# Patient Record
Sex: FEMALE | Race: BLACK OR AFRICAN AMERICAN | NOT HISPANIC OR LATINO | Employment: OTHER | URBAN - METROPOLITAN AREA
[De-identification: names, ages, dates, MRNs, and addresses within clinical notes are randomized per-mention and may not be internally consistent; named-entity substitution may affect disease eponyms.]

---

## 2017-01-30 RX ORDER — ESCITALOPRAM OXALATE 20 MG/1
TABLET ORAL
Qty: 90 TABLET | Refills: 0 | Status: SHIPPED | OUTPATIENT
Start: 2017-01-30 | End: 2017-04-26 | Stop reason: SDUPTHER

## 2017-02-06 ENCOUNTER — TELEPHONE (OUTPATIENT)
Dept: FAMILY MEDICINE | Facility: CLINIC | Age: 77
End: 2017-02-06

## 2017-02-06 ENCOUNTER — PATIENT MESSAGE (OUTPATIENT)
Dept: FAMILY MEDICINE | Facility: CLINIC | Age: 77
End: 2017-02-06

## 2017-02-06 ENCOUNTER — LAB VISIT (OUTPATIENT)
Dept: LAB | Facility: HOSPITAL | Age: 77
End: 2017-02-06
Attending: FAMILY MEDICINE
Payer: MEDICARE

## 2017-02-06 DIAGNOSIS — E78.5 DYSLIPIDEMIA: ICD-10-CM

## 2017-02-06 DIAGNOSIS — I10 ESSENTIAL HYPERTENSION: ICD-10-CM

## 2017-02-06 DIAGNOSIS — F03.91 DEMENTIA WITH BEHAVIORAL DISTURBANCE, UNSPECIFIED DEMENTIA TYPE: Primary | ICD-10-CM

## 2017-02-06 DIAGNOSIS — R79.9 ABNORMAL FINDING OF BLOOD CHEMISTRY: ICD-10-CM

## 2017-02-06 LAB
ALBUMIN SERPL BCP-MCNC: 3.7 G/DL
ALP SERPL-CCNC: 90 U/L
ALT SERPL W/O P-5'-P-CCNC: 17 U/L
ANION GAP SERPL CALC-SCNC: 11 MMOL/L
AST SERPL-CCNC: 20 U/L
BASOPHILS # BLD AUTO: 0.02 K/UL
BASOPHILS NFR BLD: 0.3 %
BILIRUB SERPL-MCNC: 0.8 MG/DL
BUN SERPL-MCNC: 19 MG/DL
CALCIUM SERPL-MCNC: 9.5 MG/DL
CHLORIDE SERPL-SCNC: 105 MMOL/L
CO2 SERPL-SCNC: 25 MMOL/L
CREAT SERPL-MCNC: 1.3 MG/DL
DIFFERENTIAL METHOD: ABNORMAL
EOSINOPHIL # BLD AUTO: 0.2 K/UL
EOSINOPHIL NFR BLD: 2.6 %
ERYTHROCYTE [DISTWIDTH] IN BLOOD BY AUTOMATED COUNT: 13.2 %
EST. GFR  (AFRICAN AMERICAN): 45 ML/MIN/1.73 M^2
EST. GFR  (NON AFRICAN AMERICAN): 39 ML/MIN/1.73 M^2
GLUCOSE SERPL-MCNC: 143 MG/DL
HCT VFR BLD AUTO: 37.2 %
HGB BLD-MCNC: 13 G/DL
LYMPHOCYTES # BLD AUTO: 2.3 K/UL
LYMPHOCYTES NFR BLD: 35.6 %
MCH RBC QN AUTO: 31.4 PG
MCHC RBC AUTO-ENTMCNC: 34.9 %
MCV RBC AUTO: 90 FL
MONOCYTES # BLD AUTO: 0.4 K/UL
MONOCYTES NFR BLD: 6.7 %
NEUTROPHILS # BLD AUTO: 3.5 K/UL
NEUTROPHILS NFR BLD: 54.8 %
PLATELET # BLD AUTO: 144 K/UL
PMV BLD AUTO: 10.7 FL
POTASSIUM SERPL-SCNC: 4.2 MMOL/L
PROT SERPL-MCNC: 7.4 G/DL
RBC # BLD AUTO: 4.14 M/UL
SODIUM SERPL-SCNC: 141 MMOL/L
WBC # BLD AUTO: 6.43 K/UL

## 2017-02-06 PROCEDURE — 80053 COMPREHEN METABOLIC PANEL: CPT

## 2017-02-06 PROCEDURE — 83036 HEMOGLOBIN GLYCOSYLATED A1C: CPT

## 2017-02-06 PROCEDURE — 85025 COMPLETE CBC W/AUTO DIFF WBC: CPT

## 2017-02-06 PROCEDURE — 80061 LIPID PANEL: CPT

## 2017-02-06 PROCEDURE — 36415 COLL VENOUS BLD VENIPUNCTURE: CPT

## 2017-02-07 LAB
CHOLEST/HDLC SERPL: 4.7 {RATIO}
ESTIMATED AVG GLUCOSE: 157 MG/DL
HBA1C MFR BLD HPLC: 7.1 %
HDL/CHOLESTEROL RATIO: 21.3 %
HDLC SERPL-MCNC: 169 MG/DL
HDLC SERPL-MCNC: 36 MG/DL
LDLC SERPL CALC-MCNC: 111.2 MG/DL
NONHDLC SERPL-MCNC: 133 MG/DL
TRIGL SERPL-MCNC: 109 MG/DL

## 2017-02-10 ENCOUNTER — PATIENT MESSAGE (OUTPATIENT)
Dept: FAMILY MEDICINE | Facility: CLINIC | Age: 77
End: 2017-02-10

## 2017-02-13 ENCOUNTER — TELEPHONE (OUTPATIENT)
Dept: FAMILY MEDICINE | Facility: CLINIC | Age: 77
End: 2017-02-13

## 2017-02-13 ENCOUNTER — TELEPHONE (OUTPATIENT)
Dept: INTERNAL MEDICINE | Facility: CLINIC | Age: 77
End: 2017-02-13

## 2017-02-13 NOTE — TELEPHONE ENCOUNTER
----- Message from Judith Aguiar sent at 2/10/2017  3:32 PM CST -----  Contact: Leigh/[employer]  Leigh stated that they  location is in [place], [place] and that can not provide equipments  for the [place] location. And the request for the bedside commode will be cancel, Please call her at [phone] if there is any question.      Thanks  Td

## 2017-02-13 NOTE — TELEPHONE ENCOUNTER
Returned call. Spoke with patients daughter and she was just calling to check on order for potty chair for patient. Informed patients daughter that info was faxed over Friday to [facility]. Pts daughter verbalized understanding.

## 2017-02-13 NOTE — TELEPHONE ENCOUNTER
Returned call. Spoke with . The papers were faxed to the wrong supply agency. Orders pulled from fax stack and refaxed to [facility].

## 2017-02-13 NOTE — TELEPHONE ENCOUNTER
----- Message from Ashley Parker sent at 2/10/2017  1:44 PM CST -----  Contact: pt  Pt daughter Hermelindo is requesting a call back, please call her at [phone]

## 2017-02-14 RX ORDER — PRAVASTATIN SODIUM 40 MG/1
TABLET ORAL
Qty: 90 TABLET | Refills: 0 | Status: SHIPPED | OUTPATIENT
Start: 2017-02-14 | End: 2017-05-10

## 2017-03-07 RX ORDER — DONEPEZIL HYDROCHLORIDE 10 MG/1
TABLET, FILM COATED ORAL
Qty: 90 TABLET | Refills: 0 | Status: SHIPPED | OUTPATIENT
Start: 2017-03-07 | End: 2017-05-10

## 2017-03-27 RX ORDER — QUETIAPINE FUMARATE 100 MG/1
TABLET, FILM COATED ORAL
Qty: 450 TABLET | Refills: 0 | Status: SHIPPED | OUTPATIENT
Start: 2017-03-27

## 2017-04-17 ENCOUNTER — OFFICE VISIT (OUTPATIENT)
Dept: FAMILY MEDICINE | Facility: CLINIC | Age: 77
End: 2017-04-17
Payer: MEDICARE

## 2017-04-17 VITALS
RESPIRATION RATE: 18 BRPM | TEMPERATURE: 98 F | DIASTOLIC BLOOD PRESSURE: 72 MMHG | WEIGHT: 175.94 LBS | HEART RATE: 76 BPM | HEIGHT: 62 IN | BODY MASS INDEX: 32.37 KG/M2 | SYSTOLIC BLOOD PRESSURE: 127 MMHG

## 2017-04-17 DIAGNOSIS — Z71.85 IMMUNIZATION COUNSELING: ICD-10-CM

## 2017-04-17 DIAGNOSIS — I10 ESSENTIAL HYPERTENSION: ICD-10-CM

## 2017-04-17 DIAGNOSIS — G30.1 LATE ONSET ALZHEIMER'S DISEASE WITHOUT BEHAVIORAL DISTURBANCE: Primary | ICD-10-CM

## 2017-04-17 DIAGNOSIS — E78.2 MIXED HYPERLIPIDEMIA: ICD-10-CM

## 2017-04-17 DIAGNOSIS — F02.80 LATE ONSET ALZHEIMER'S DISEASE WITHOUT BEHAVIORAL DISTURBANCE: Primary | ICD-10-CM

## 2017-04-17 PROCEDURE — 99999 PR PBB SHADOW E&M-EST. PATIENT-LVL III: CPT | Mod: PBBFAC,,, | Performed by: NURSE PRACTITIONER

## 2017-04-17 PROCEDURE — 99213 OFFICE O/P EST LOW 20 MIN: CPT | Mod: S$PBB,,, | Performed by: NURSE PRACTITIONER

## 2017-04-17 PROCEDURE — 99213 OFFICE O/P EST LOW 20 MIN: CPT | Mod: PBBFAC,PO | Performed by: NURSE PRACTITIONER

## 2017-04-17 NOTE — MR AVS SNAPSHOT
[facility] Medicine  [address]  [facility] [address]  Phone: [phone]  Fax: [phone]                  Lisha Aiken   2017 4:40 PM   Office Visit    Description:  Female : 1940   Provider:  Courtney Alcazar NP   Department:  [facility]           Reason for Visit     Follow-up           Diagnoses this Visit        Comments    Late onset Alzheimer's disease without behavioral disturbance    -  Primary     Essential hypertension         Mixed hyperlipidemia         Immunization counseling                To Do List           Future Appointments        Provider Department Dept Phone    2017 4:20 PM Randi Correa MD [facility] [phone]    2017 4:00 PM Suhail Trejo MD [facility] - Neurology [phone]      Goals (5 Years of Data)     None      [facility]     [facility] Nurse Care Line -  Assistance  Unless otherwise directed by your provider, please contact [facility], our nurse care line that is available for  assistance.     Registered nurses in the [facility] Center provide: appointment scheduling, clinical advisement, health education, and other advisory services.  Call: [phone] (toll free)               Medications           Message regarding Medications     Verify the changes and/or additions to your medication regime listed below are the same as discussed with your clinician today.  If any of these changes or additions are incorrect, please notify your healthcare provider.             Verify that the below list of medications is an accurate representation of the medications you are currently taking.  If none reported, the list may be blank. If incorrect, please contact your healthcare provider. Carry this list with you in case of emergency.           Current Medications     aspirin (ECOTRIN) 81 MG EC tablet Take 81 mg by mouth once daily.    CALCIUM  "CARBONATE/VITAMIN D3 (CALTRATE-600 + D VIT D3, 800, ORAL) Take by mouth.    donepezil (ARICEPT) 10 MG tablet TAKE 1 TABLET BY MOUTH EVERY DAY    donepezil (ARICEPT) 10 MG tablet TAKE 1 TABLET BY MOUTH ONCE DAILY    escitalopram oxalate (LEXAPRO) 20 MG tablet TAKE 1 TABLET BY MOUTH EVERY DAY    lisinopril-hydrochlorothiazide (PRINZIDE,ZESTORETIC) 20-12.5 mg per tablet Take 1 tablet by mouth once daily.    pravastatin (PRAVACHOL) 10 MG tablet TAKE ONE TABLET BY MOUTH ONCE NIGHTLY    pravastatin (PRAVACHOL) 40 MG tablet TAKE 1 TABLET BY MOUTH DAILY    quetiapine (SEROQUEL) 100 MG Tab TAKE 1 TABLET BY MOUTH EVERY MORNING AND TAKE 2 TABLETS BY MOUTH TWICE DAILY           Clinical Reference Information           Your Vitals Were     BP Pulse Temp Resp Height Weight    127/72 76 98.1 °F (36.7 °C) (Oral) 18 5' 2" (1.575 m) 79.8 kg (175 lb 14.8 oz)    BMI                32.18 kg/m2          Blood Pressure          Most Recent Value    BP  127/72      Allergies as of 4/17/2017     No Known Allergies      Immunizations Administered on Date of Encounter - 4/17/2017     None      Language Assistance Services     ATTENTION: Language assistance services are available, free of charge. Please call [phone].      ATENCIÓN: Si tiana vj, tiene a posadas disposición servicios gratuitos de asistencia lingüística. Llame al [phone].     [facility] Ý: N?u b?n nói Ti?ng Vi?t, có các d?ch v? h? tr? ngôn ng? mi?n phí dành cho b?n. G?i s? [phone].         [facility] complies with applicable Federal civil rights laws and does not discriminate on the basis of race, color, national origin, age, disability, or sex.        "

## 2017-04-20 ENCOUNTER — PATIENT MESSAGE (OUTPATIENT)
Dept: FAMILY MEDICINE | Facility: CLINIC | Age: 77
End: 2017-04-20

## 2017-04-20 RX ORDER — LISINOPRIL AND HYDROCHLOROTHIAZIDE 12.5; 2 MG/1; MG/1
TABLET ORAL
Qty: 90 TABLET | Refills: 0 | Status: SHIPPED | OUTPATIENT
Start: 2017-04-20

## 2017-04-20 NOTE — PROGRESS NOTES
Subjective:       Patient ID: Lisha Aiken is a 76 y.o. female.    Chief Complaint: Follow-up  pt reports to clinic with caregiver daughter. Pt's daughter reports she is considering nursing home place.  States her mother's mental status is declining more and she needs help providing care.  Reports her mother is having episodes of incontinence. Voiding via trashcan.  States she found a facility in [place] and was told PCP needs to fax over H/P. Pt has not seen neurology in more than 1 year for management of dementia.  Denies combative episodes.    HPI  Review of Systems   Constitutional: Negative.    HENT: Negative.    Respiratory: Negative.    Gastrointestinal:        Incontinence of bowel   Genitourinary:        Incontinence of bladder   Neurological: Negative.    Psychiatric/Behavioral: Positive for agitation, behavioral problems and confusion.       Objective:      Physical Exam   Constitutional: She appears well-developed and well-nourished.   HENT:   Head: Normocephalic.   Eyes: EOM are normal.   Neck: Neck supple.   Cardiovascular: Normal rate.    Pulmonary/Chest: Effort normal and breath sounds normal.   Abdominal: Soft. Bowel sounds are normal. She exhibits no distension. There is no tenderness.   Musculoskeletal: Normal range of motion.   Neurological: She is alert. She has normal strength. She is disoriented (x3 person, place and time). GCS eye subscore is 4. GCS verbal subscore is 5. GCS motor subscore is 6.   Cooperative, follow command appropriately   Skin: Skin is warm and dry.   Psychiatric: She has a normal mood and affect.   Vitals reviewed.      Assessment:       1. Late onset Alzheimer's disease without behavioral disturbance    2. Essential hypertension    3. Mixed hyperlipidemia    4. Immunization counseling        Plan:   Late onset Alzheimer's disease without behavioral disturbance  Follow upwith neuro  Essential hypertension  stable  Mixed hyperlipidemia  stable  Immunization  counseling      Will reach out the facility for required doucmentation

## 2017-04-24 ENCOUNTER — TELEPHONE (OUTPATIENT)
Dept: FAMILY MEDICINE | Facility: CLINIC | Age: 77
End: 2017-04-24

## 2017-04-25 ENCOUNTER — PATIENT MESSAGE (OUTPATIENT)
Dept: FAMILY MEDICINE | Facility: CLINIC | Age: 77
End: 2017-04-25

## 2017-04-25 NOTE — TELEPHONE ENCOUNTER
Called and spoke with patients daughter. Daughter wanted to check on nursing home papers. Informed i was not familiar with the situation. Got all info from patients daughter. ( patientws daughter cell number [phone])    Called [facility] and spoke with Miladis Craig MPA,  - [phone], fax number [phone], cell number [phone].     List of info they need as soon as possible -   - Referral  - face sheet  - Passr form? -  She will fax  - PPD skin test  - Chest xray      Please add orders and i will call patients daughter with appts and info.

## 2017-04-25 NOTE — TELEPHONE ENCOUNTER
----- Message from Isiah Jacobo sent at 4/24/2017  2:07 PM CDT -----  The pt's daughter Hermelindo Ewing is requesting a call back at [phone]

## 2017-04-26 ENCOUNTER — TELEPHONE (OUTPATIENT)
Dept: FAMILY MEDICINE | Facility: CLINIC | Age: 77
End: 2017-04-26

## 2017-04-26 DIAGNOSIS — Z00.00 ANNUAL PHYSICAL EXAM: Primary | ICD-10-CM

## 2017-04-26 NOTE — TELEPHONE ENCOUNTER
Last office visit 04/17/2017. Last refill date 01/30/2017. Pt is requesting a refill on escitalopram 20 mg #90 qd.

## 2017-04-26 NOTE — TELEPHONE ENCOUNTER
Orders for CXR placed. Please tell patient's daughter I apologize if she feels as though her appointment was wasted.  However, with my experience facility's typically require more than she stated.  I wanted to do what was best for her, and double check everything required so that she can make ONE trip and have blood work, xrays, paperwork etc all done at one time.

## 2017-04-27 RX ORDER — ESCITALOPRAM OXALATE 20 MG/1
20 TABLET ORAL DAILY
Qty: 90 TABLET | Refills: 3 | Status: SHIPPED | OUTPATIENT
Start: 2017-04-27

## 2017-05-09 ENCOUNTER — HOSPITAL ENCOUNTER (OUTPATIENT)
Dept: RADIOLOGY | Facility: HOSPITAL | Age: 77
Discharge: HOME | End: 2017-05-09
Attending: NURSE PRACTITIONER
Payer: MEDICARE

## 2017-05-09 ENCOUNTER — OFFICE VISIT (OUTPATIENT)
Dept: FAMILY MEDICINE | Facility: CLINIC | Age: 77
End: 2017-05-09
Payer: MEDICARE

## 2017-05-09 VITALS
DIASTOLIC BLOOD PRESSURE: 60 MMHG | TEMPERATURE: 98 F | BODY MASS INDEX: 32.46 KG/M2 | SYSTOLIC BLOOD PRESSURE: 118 MMHG | HEIGHT: 62 IN | OXYGEN SATURATION: 98 % | HEART RATE: 103 BPM | WEIGHT: 176.38 LBS

## 2017-05-09 DIAGNOSIS — Z00.00 ANNUAL PHYSICAL EXAM: ICD-10-CM

## 2017-05-09 DIAGNOSIS — R76.11 POSITIVE PPD: Primary | ICD-10-CM

## 2017-05-09 PROCEDURE — 71020 XR CHEST PA AND LATERAL: CPT | Mod: TC,PO

## 2017-05-09 PROCEDURE — 99999 PR PBB SHADOW E&M-EST. PATIENT-LVL III: CPT | Mod: PBBFAC,,, | Performed by: FAMILY MEDICINE

## 2017-05-09 PROCEDURE — 99214 OFFICE O/P EST MOD 30 MIN: CPT | Mod: S$PBB,,, | Performed by: FAMILY MEDICINE

## 2017-05-09 PROCEDURE — 71020 XR CHEST PA AND LATERAL: CPT | Mod: 26,,, | Performed by: RADIOLOGY

## 2017-05-09 NOTE — PROGRESS NOTES
Subjective:       Patient ID: Lisha Aiken is a 76 y.o. female.    Chief Complaint: No chief complaint on file.    HPI Comments: 76 y old female who is under the process of getting PE prior Nursing home admission . She had PPD administered on 5/5 and it was read as + yesterday so she is here for further advise. No chronic cough , night sweats, fever . Weight loss     Review of Systems   Constitutional: Negative.    HENT: Negative.    Respiratory: Negative.    Cardiovascular: Negative.    Gastrointestinal: Negative.    Genitourinary: Negative.    Musculoskeletal: Negative.        Objective:      Physical Exam   Constitutional: She is oriented to person, place, and time. She appears well-developed and well-nourished. No distress.   HENT:   Head: Normocephalic and atraumatic.   Right Ear: External ear normal.   Left Ear: External ear normal.   Mouth/Throat: No oropharyngeal exudate.   Eyes: Conjunctivae and EOM are normal. Pupils are equal, round, and reactive to light. Right eye exhibits no discharge. Left eye exhibits no discharge. No scleral icterus.   Neck: Normal range of motion. Neck supple. No JVD present. No tracheal deviation present. No thyromegaly present.   Cardiovascular: Normal rate, regular rhythm and normal heart sounds.  Exam reveals no gallop and no friction rub.    No murmur heard.  Pulmonary/Chest: Effort normal and breath sounds normal. No stridor. No respiratory distress. She has no wheezes. She has no rales. She exhibits no tenderness.   Abdominal: Soft. Bowel sounds are normal. She exhibits no distension. There is no tenderness. There is no rebound and no guarding.   Musculoskeletal: Normal range of motion.   Lymphadenopathy:     She has no cervical adenopathy.   Neurological: She is alert and oriented to person, place, and time.   Skin: Skin is warm and dry. She is not diaphoretic.   Psychiatric: She has a normal mood and affect. Her behavior is normal. Judgment and thought content  normal.       Assessment:      Diagnoses and all orders for this visit:    Positive PPD      Plan:   GET CXR   We will cont to f.u

## 2017-05-10 ENCOUNTER — LAB VISIT (OUTPATIENT)
Dept: LAB | Facility: HOSPITAL | Age: 77
End: 2017-05-10
Attending: INTERNAL MEDICINE
Payer: MEDICARE

## 2017-05-10 ENCOUNTER — OFFICE VISIT (OUTPATIENT)
Dept: PULMONOLOGY | Facility: CLINIC | Age: 77
End: 2017-05-10
Payer: MEDICARE

## 2017-05-10 VITALS
DIASTOLIC BLOOD PRESSURE: 60 MMHG | SYSTOLIC BLOOD PRESSURE: 143 MMHG | RESPIRATION RATE: 18 BRPM | OXYGEN SATURATION: 95 % | BODY MASS INDEX: 32.57 KG/M2 | HEIGHT: 62 IN | WEIGHT: 177 LBS | HEART RATE: 81 BPM

## 2017-05-10 DIAGNOSIS — R76.11 POSITIVE PPD: Primary | ICD-10-CM

## 2017-05-10 DIAGNOSIS — R76.11 POSITIVE PPD: ICD-10-CM

## 2017-05-10 PROCEDURE — 86480 TB TEST CELL IMMUN MEASURE: CPT

## 2017-05-10 PROCEDURE — 99999 PR PBB SHADOW E&M-EST. PATIENT-LVL III: CPT | Mod: PBBFAC,,, | Performed by: INTERNAL MEDICINE

## 2017-05-10 PROCEDURE — 36415 COLL VENOUS BLD VENIPUNCTURE: CPT

## 2017-05-10 PROCEDURE — 99204 OFFICE O/P NEW MOD 45 MIN: CPT | Mod: S$PBB,,, | Performed by: INTERNAL MEDICINE

## 2017-05-10 NOTE — PROGRESS NOTES
History & Physical    SUBJECTIVE:     History of Present Illness:  Patient is a 76 y.o. female presents with  possible positive PPD.  Referred by Dr. Randi Roca  Patient is mother to one of our colleagues.    She has advanced dementia preparation for nursing home placement  No TB risk factors.  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  No documentation of the duration size.  In my office visit there in duration appears to be less than 8 mm  No cough no wheezing no shortness of breath no night sweats no weight loss.  No history of tuberculosis exposure no history of tuberculosis treatment  Chest x-ray is normal  No smoking history  I have reviewed the patient's medical history in detail and updated the computerized patient record.    I've explained to patient's daughter that this matter can be easily resolved by getting a QuantiFERON Gold test in  My pretest suspicion is low  Chest x-ray is clear    Chief Complaint   Patient presents with    POSSIBLE POSITIVE PPD       Review of patient's allergies indicates:  No Known Allergies    Current Outpatient Prescriptions   Medication Sig Dispense Refill    aspirin (ECOTRIN) 81 MG EC tablet Take 81 mg by mouth once daily.      CALCIUM CARBONATE/VITAMIN D3 (CALTRATE-600 + D VIT D3, 800, ORAL) Take by mouth.      donepezil (ARICEPT) 10 MG tablet TAKE 1 TABLET BY MOUTH EVERY DAY 90 tablet 0    escitalopram oxalate (LEXAPRO) 20 MG tablet Take 1 tablet (20 mg total) by mouth once daily. 90 tablet 3    lisinopril-hydrochlorothiazide (PRINZIDE,ZESTORETIC) 20-12.5 mg per tablet TAKE 1 TABLET BY MOUTH ONCE DAILY 90 tablet 0    pravastatin (PRAVACHOL) 10 MG tablet TAKE ONE TABLET BY MOUTH ONCE NIGHTLY 90 tablet 1    quetiapine (SEROQUEL) 100 MG Tab TAKE 1 TABLET BY MOUTH EVERY MORNING AND TAKE 2 TABLETS BY MOUTH TWICE  "DAILY 450 tablet 0     No current facility-administered medications for this visit.        Past Medical History:   Diagnosis Date    Allergic rhinitis     Alzheimer's disease     followed by Dr. Johns    Depression     Hyperlipidemia     Hypertension     Prediabetes      Past Surgical History:   Procedure Laterality Date    HYSTERECTOMY      KNEE ARTHROPLASTY      NASAL SEPTUM SURGERY       Family History   Problem Relation Age of Onset    Hypertension Sister     Cancer Brother      Social History   Substance Use Topics    Smoking status: Never Smoker    Smokeless tobacco: Never Used    Alcohol use 0.6 oz/week     1 Cans of beer per week      Comment: rarely        Review of Systems:  Review of Systems   Constitutional: Negative.    HENT: Negative.    Eyes: Negative.    Respiratory: Negative.    Cardiovascular: Negative.    Gastrointestinal: Negative.    Endocrine: Negative.    Genitourinary: Negative.    Musculoskeletal: Negative.    Skin: Negative.    Allergic/Immunologic: Negative.    Neurological: Negative.    Hematological: Negative.    Psychiatric/Behavioral: Negative.        OBJECTIVE:     Vital Signs (Most Recent)  Pulse: 81 (05/10/17 0959)  Resp: 18 (05/10/17 0959)  BP: (!) 143/60 (05/10/17 0959)  SpO2: 95 % (05/10/17 0959)  5' 2" (1.575 m)  80.3 kg (177 lb 0.5 oz)     Physical Exam:  Physical Exam   Constitutional: She is oriented to person, place, and time. She appears well-developed and well-nourished.   HENT:   Head: Normocephalic and atraumatic.   Nose: Nose normal.   Mouth/Throat: Oropharynx is clear and moist.   Eyes: Conjunctivae and EOM are normal. Pupils are equal, round, and reactive to light.   Neck: Normal range of motion. Neck supple. No JVD present. No thyromegaly present.   Cardiovascular: Normal rate and regular rhythm.  Exam reveals no friction rub.    No murmur heard.  Pulmonary/Chest: Effort normal and breath sounds normal. No respiratory distress. She has no wheezes. "   Abdominal: Soft. Bowel sounds are normal.   Musculoskeletal: Normal range of motion. She exhibits no edema.   Neurological: She is alert and oriented to person, place, and time. She has normal reflexes.   Skin: Skin is warm and dry.   Psychiatric: She has a normal mood and affect.   Nursing note and vitals reviewed.      Laboratory  CBC: Reviewed  BMP: Reviewed    Diagnostic Results:  X-Ray: Reviewed  Comparison: 11/16/2015     Technique: PA and lateral views of the chest was obtained     Findings: The cardiac and mediastinal silhouettes are within normal limits. The lungs are clear bilaterally. Multilevel degenerative changes noted throughout the visualized spine.  IMPRESSION:      No acute findings     ASSESSMENT/PLAN:     Problem List Items Addressed This Visit     None      Visit Diagnoses     Positive PPD    -  Primary    Relevant Orders    QUANTIFERON GOLD TB        Quantiferon ordered   Clear CXR  If positive then 5 months with RIF vs 9 months INH:   PLAN:Plan      This note was prepared using voice recognition system and is likely to have sound alike errors that may have been overlooked even after proof reading.  Please call me with any questions    Discussed diagnosis, its evaluation, treatment and usual course. All questions answered.    Thank you for the courtesy of participating in the care of this patient    Abe Padgett MD

## 2017-05-10 NOTE — MR AVS SNAPSHOT
[facility]monary [facility]  [address] [facility] [address]  Phone: [phone]  Fax: [phone]                  Lisha Aiken   5/10/2017 10:00 AM   Office Visit    Description:  Female : 1940   Provider:  Abe Padgett MD   Department:  [facility]           Reason for Visit     POSSIBLE POSITIVE PPD           Diagnoses this Visit        Comments    Positive PPD    -  Primary            To Do List           Future Appointments        Provider Department Dept Phone    2017 4:20 PM Randi Correa MD [facility] [phone]    2017 4:00 PM Suhail Trejo MD [facility] Neurology [phone]      Goals (5 Years of Data)     None      Follow-Up and Disposition     Return in about 3 months (around 8/10/2017) for Quantiferon.      [facility] Call     [facility] On Call Nurse Care Line -  Assistance  Unless otherwise directed by your provider, please contact [facility], our nurse care line that is available for  assistance.     Registered nurses in the [facility] On Call Center provide: appointment scheduling, clinical advisement, health education, and other advisory services.  Call: [phone] (toll free)               Medications           Message regarding Medications     Verify the changes and/or additions to your medication regime listed below are the same as discussed with your clinician today.  If any of these changes or additions are incorrect, please notify your healthcare provider.             Verify that the below list of medications is an accurate representation of the medications you are currently taking.  If none reported, the list may be blank. If incorrect, please contact your healthcare provider. Carry this list with you in case of emergency.           Current Medications     aspirin (ECOTRIN) 81 MG EC tablet Take 81 mg by mouth once daily.    CALCIUM CARBONATE/VITAMIN D3 (CALTRATE-600 + D  "VIT D3, 800, ORAL) Take by mouth.    donepezil (ARICEPT) 10 MG tablet TAKE 1 TABLET BY MOUTH EVERY DAY    escitalopram oxalate (LEXAPRO) 20 MG tablet Take 1 tablet (20 mg total) by mouth once daily.    lisinopril-hydrochlorothiazide (PRINZIDE,ZESTORETIC) 20-12.5 mg per tablet TAKE 1 TABLET BY MOUTH ONCE DAILY    pravastatin (PRAVACHOL) 10 MG tablet TAKE ONE TABLET BY MOUTH ONCE NIGHTLY    quetiapine (SEROQUEL) 100 MG Tab TAKE 1 TABLET BY MOUTH EVERY MORNING AND TAKE 2 TABLETS BY MOUTH TWICE DAILY           Clinical Reference Information           Your Vitals Were     BP Pulse Resp Height Weight SpO2    143/60 81 18 5' 2" (1.575 m) 80.3 kg (177 lb 0.5 oz) 95%    BMI                32.38 kg/m2          Blood Pressure          Most Recent Value    BP  (!)  143/60      Allergies as of 5/10/2017     No Known Allergies      Immunizations Administered on Date of Encounter - 5/10/2017     None      Instructions    Thank You    Thank you for choosing the PULMONARY MEDICINE DEPARTMENT at [facility]. At [facility], your satisfaction is our priority. You may receive a survey asking about your experience with us. We would appreciate you taking the time to complete and return the survey. Our goal is to provide you with a level 5 or Very Good experience. We thank you for allowing us to serve you and value your feedback.    Your Nurse/Medical Assistant: ___Tab Barboza___________________________    Sincerely,  Pulmonary Department  Phone: [phone]  Fax: [phone]              Language Assistance Services     ATTENTION: Language assistance services are available, free of charge. Please call [phone].      ATENCIÓN: Si habla español, tiene a posadas disposición servicios gratuitos de asistencia lingüística. Llame al [phone].     RODRÍGUEZ Ý: N?u b?n nói Ti?ng Vi?t, có các d?ch v? h? tr? ngôn ng? mi?n phí dành cho b?n. G?i s? [phone].         [facility] complies with " applicable Federal civil rights laws and does not discriminate on the basis of race, color, national origin, age, disability, or sex.

## 2017-05-10 NOTE — PATIENT INSTRUCTIONS
Thank You    Thank you for choosing the PULMONARY MEDICINE DEPARTMENT at [facility]. At [facility], your satisfaction is our priority. You may receive a survey asking about your experience with us. We would appreciate you taking the time to complete and return the survey. Our goal is to provide you with a level 5 or Very Good experience. We thank you for allowing us to serve you and value your feedback.    Your Nurse/Medical Assistant: ___Tab Barboza___________________________    Sincerely,  Pulmonary Department  Phone: [phone]  Fax: [phone]

## 2017-05-10 NOTE — LETTER
May 10, 2017      [place]  [address] [address] [address]           [facility]  [address] [place] [address]  Phone: [phone]  Fax: [phone]          Patient: Lisha Aiken   MR Number: [id number]   YOB: 1940   Date of Visit: 5/10/2017       Dear Dr. Randi Correa:    Thank you for referring Lisha Aiken to me for evaluation. Attached you will find relevant portions of my assessment and plan of care.    If you have questions, please do not hesitate to call me. I look forward to following Lisha Aiken along with you.    Sincerely,    Abe Padgett MD    Enclosure  CC:  No Recipients    If you would like to receive this communication electronically, please contact [email or web address] or [phone] to request more information on [employer] Link access.    For providers and/or their staff who would like to refer a patient to [facility], please contact us through our one-stop-shop provider referral line, [facility] , at [phone].    If you feel you have received this communication in error or would no longer like to receive these types of communications, please e-mail [email or web address]

## 2017-05-11 RX ORDER — PRAVASTATIN SODIUM 10 MG/1
TABLET ORAL
Qty: 90 TABLET | Refills: 1 | Status: SHIPPED | OUTPATIENT
Start: 2017-05-11

## 2017-05-11 NOTE — TELEPHONE ENCOUNTER
Last office visit 05/09/2017. Pt is requesting a  90 day supply instead of 30 day supply of pravastatin 40mg qd.

## 2017-05-12 ENCOUNTER — TELEPHONE (OUTPATIENT)
Dept: PULMONOLOGY | Facility: CLINIC | Age: 77
End: 2017-05-12

## 2017-05-12 LAB
MITOGEN NIL: 9.89 IU/ML
NIL: 0.04 IU/ML
TB ANTIGEN NIL: 0.76 IU/ML
TB ANTIGEN: 0.8 IU/ML
TB GOLD: POSITIVE

## 2017-05-12 NOTE — TELEPHONE ENCOUNTER
----- Message from Beckie Aguiar sent at 5/11/2017  8:24 AM CDT -----  Contact: savannah jarvis/daughter   Caller was returning nurse call    ..[phone] (home)

## 2017-05-15 ENCOUNTER — TELEPHONE (OUTPATIENT)
Dept: PULMONOLOGY | Facility: CLINIC | Age: 77
End: 2017-05-15

## 2017-05-15 ENCOUNTER — DOCUMENTATION ONLY (OUTPATIENT)
Dept: PULMONOLOGY | Facility: CLINIC | Age: 77
End: 2017-05-15

## 2017-05-15 DIAGNOSIS — R76.12 (QFT) QUANTIFERON-TB TEST REACTION WITHOUT ACTIVE TUBERCULOSIS: Chronic | ICD-10-CM

## 2017-05-15 DIAGNOSIS — Z22.7 LTBI (LATENT TUBERCULOSIS INFECTION): Chronic | ICD-10-CM

## 2017-05-15 NOTE — TELEPHONE ENCOUNTER
Informed Quantiferon was +ve  Options for  mg for 4 months or INH 300mg for 9 months  Get baseline LFT  Spoke with NATALY

## 2017-05-15 NOTE — PROGRESS NOTES
Spoke with NH manager  Sophy:[phone]  Will draw LFT  Will start treatment for LTBI either RIF or INH with B6

## 2017-05-18 DIAGNOSIS — Z22.7 LTBI (LATENT TUBERCULOSIS INFECTION): Primary | Chronic | ICD-10-CM

## 2017-05-18 RX ORDER — LANOLIN ALCOHOL/MO/W.PET/CERES
50 CREAM (GRAM) TOPICAL DAILY
Qty: 30 TABLET | Refills: 8 | Status: SHIPPED | OUTPATIENT
Start: 2017-05-18 | End: 2017-05-19 | Stop reason: SDUPTHER

## 2017-05-18 RX ORDER — ISONIAZID 300 MG/1
300 TABLET ORAL DAILY
Qty: 30 TABLET | Refills: 8 | Status: SHIPPED | OUTPATIENT
Start: 2017-05-18 | End: 2017-05-19 | Stop reason: SDUPTHER

## 2017-05-18 NOTE — PROGRESS NOTES
LFTs total protein is 6.2, albumin is 3.4, bilirubin is 0.4, direct bilirubin is 0.2, AST is 28 LT is 20 alkaline phosphatase is 79.    Patient on isoniazid and B6.

## 2017-05-19 DIAGNOSIS — Z22.7 LTBI (LATENT TUBERCULOSIS INFECTION): Chronic | ICD-10-CM

## 2017-05-19 RX ORDER — LANOLIN ALCOHOL/MO/W.PET/CERES
50 CREAM (GRAM) TOPICAL DAILY
Qty: 30 TABLET | Refills: 8 | Status: SHIPPED | OUTPATIENT
Start: 2017-05-19 | End: 2017-06-18

## 2017-05-19 RX ORDER — ISONIAZID 300 MG/1
300 TABLET ORAL DAILY
Qty: 30 TABLET | Refills: 8 | Status: SHIPPED | OUTPATIENT
Start: 2017-05-19 | End: 2017-06-18

## 2017-07-05 ENCOUNTER — TELEPHONE (OUTPATIENT)
Dept: PULMONOLOGY | Facility: CLINIC | Age: 77
End: 2017-07-05

## 2018-12-03 ENCOUNTER — PATIENT OUTREACH (OUTPATIENT)
Dept: ADMINISTRATIVE | Facility: HOSPITAL | Age: 78
End: 2018-12-03

## 2018-12-03 NOTE — PROGRESS NOTES
I spoke to pt's daughter that stated she is in a nursing home in [place] and see's nursing home provider.

## 2019-09-20 ENCOUNTER — PES CALL (OUTPATIENT)
Dept: ADMINISTRATIVE | Facility: CLINIC | Age: 79
End: 2019-09-20